# Patient Record
Sex: MALE | Race: BLACK OR AFRICAN AMERICAN | Employment: FULL TIME | URBAN - METROPOLITAN AREA
[De-identification: names, ages, dates, MRNs, and addresses within clinical notes are randomized per-mention and may not be internally consistent; named-entity substitution may affect disease eponyms.]

---

## 2022-04-26 ENCOUNTER — TELEPHONE (OUTPATIENT)
Dept: PRIMARY CARE CLINIC | Age: 27
End: 2022-04-26

## 2022-07-14 ENCOUNTER — OFFICE VISIT (OUTPATIENT)
Dept: PRIMARY CARE CLINIC | Age: 27
End: 2022-07-14
Payer: MEDICAID

## 2022-07-14 VITALS
TEMPERATURE: 99 F | OXYGEN SATURATION: 99 % | HEART RATE: 82 BPM | WEIGHT: 165.8 LBS | HEIGHT: 66 IN | RESPIRATION RATE: 16 BRPM | SYSTOLIC BLOOD PRESSURE: 110 MMHG | DIASTOLIC BLOOD PRESSURE: 70 MMHG | BODY MASS INDEX: 26.65 KG/M2

## 2022-07-14 DIAGNOSIS — G89.29 CHRONIC PAIN OF RIGHT KNEE: Primary | ICD-10-CM

## 2022-07-14 DIAGNOSIS — M25.561 CHRONIC PAIN OF RIGHT KNEE: Primary | ICD-10-CM

## 2022-07-14 PROCEDURE — 99203 OFFICE O/P NEW LOW 30 MIN: CPT | Performed by: FAMILY MEDICINE

## 2022-07-14 NOTE — PROGRESS NOTES
HPI     Chief Complaint:   Chief Complaint   Patient presents with   [facility]     routine labs. Right knee hard and swollen        Francisco Thurman is an 27 y.o. male who presents to [facility]. No currently PCP. Medical history significant for:   Patient Active Problem List   Diagnosis Code    Allergic rhinitis J30.9    Wrist fracture, left S62.102A    Scoliosis M41.9    Vaccination not carried out because of parent refusal Z28.82       Concerns for today:     Patient requests physical.    Right knee pain: Intermittently occurring since childhood, worsening around adolescence. He played soccer for years and that exacerbated pain. He's always noticed a little swelling but moreso over the last several days, along with a firm area near the anterior knee. He trains \"heavy\" with weight lifting but denies any injuries. His knees buckle randomly with ambulation. He does laborious work, deck washing. On feet all day. No rashes. No other joints with swelling. Patient has tried cutting back on lower extremity training but pain persists. Current medications include:   Current Outpatient Medications   Medication Sig    multivitamin (ONE A DAY) tablet Take 1 Tab by mouth daily. (Patient not taking: Reported on 7/14/2022)    cetirizine (ZYRTEC) 10 mg tablet Take  by mouth. (Patient not taking: Reported on 7/14/2022)     No current facility-administered medications for this visit. Allergies - reviewed:    Allergies   Allergen Reactions    Zithromax [Azithromycin] Nausea Only         Past Medical History - reviewed:  Past Medical History:   Diagnosis Date    Palpitations 2009    Peds Cardio, normal eval       Social History - reviewed:  Social History     Tobacco Use    Smoking status: Never Smoker    Smokeless tobacco: Never Used   Vaping Use    Vaping Use: Never used   Substance Use Topics    Alcohol use: No     Alcohol/week: 0.0 standard drinks    Drug use: No       Past Surgical History - reviewed:  History reviewed. No pertinent surgical history. Family History - reviewed:  Family History   Problem Relation Age of Onset    Hypertension Paternal Grandmother     Hypertension Paternal Grandfather     No Known Problems Father          Immunizations - reviewed:   Immunization History   Administered Date(s) Administered    DTaP 1997, 02/18/1998, 06/03/1998, 11/29/2001    Hep B Vaccine 1997, 06/03/1998, 09/02/1998    Hib 1997, 02/18/1998, 06/03/1998, 12/04/1998    MMR 12/04/1998, 11/29/2001    Poliovirus vaccine 1997, 02/18/1998, 11/29/2001    Tdap 08/25/2008       Review of Systems   Review of Systems   Constitutional: Negative for chills and fever. Musculoskeletal: Positive for joint pain. Negative for falls and myalgias. Skin: Negative for itching and rash. Neurological: Negative for tingling and focal weakness. Objective     Visit Vitals  /70 (BP 1 Location: Left upper arm, BP Patient Position: Sitting, BP Cuff Size: Adult)   Pulse 82   Temp 99 °F (37.2 °C) (Oral)   Resp 16   Ht 5' 6\" (1.676 m)   Wt 165 lb 12.8 oz (75.2 kg)   SpO2 99%   BMI 26.76 kg/m²       Physical Exam  Vitals and nursing note reviewed. Constitutional:       General: He is not in acute distress. Cardiovascular:      Rate and Rhythm: Normal rate and regular rhythm. Pulmonary:      Effort: Pulmonary effort is normal. No respiratory distress. Breath sounds: Normal breath sounds. Musculoskeletal:      Comments: FROM both hips, patellas, malleoli. No laxity at either patella. Right knee: no laxity with valgus or varus stress. Anterior drawer test negative. No tenderness to palpation of medial or lateral joint line. No ballotable effusion. Neurological:      Mental Status: He is alert. Assessment and Plan     Benign exam. Xray no acute pathology. Patient requests MRI.  Discussed supportive care, NSAIDS, ice therapy, and avoiding overuse. Patient wishes to proceed with MRI. Diagnoses and all orders for this visit:    1. Chronic pain of right knee  -     XR KNEE RT MIN 4 V; Future  -     MRI KNEE RT WO CONT; Future         Follow-up and Dispositions    · Return in about 3 months (around 10/14/2022) for annual physical.         I discussed the aforementioned diagnoses with the patient as well as the plan of care. All questions were answered and an AVS was provided.      Lisa Hamilton MD  [facility]  [place] [address]

## 2022-07-14 NOTE — PROGRESS NOTES
Chief Complaint   Patient presents with   [facility] Establish Care     routine labs. Right knee hard and swollen     Visit Vitals  /70 (BP 1 Location: Left upper arm, BP Patient Position: Sitting, BP Cuff Size: Adult)   Pulse 82   Temp 99 °F (37.2 °C) (Oral)   Resp 16   Ht 5' 6\" (1.676 m)   Wt 165 lb 12.8 oz (75.2 kg)   SpO2 99%   BMI 26.76 kg/m²     1. Have you been to the ER, urgent care clinic since your last visit? Hospitalized since your last visit? [facility] ER 5/2022 for broken toe    2. Have you seen or consulted any other health care providers outside of the [address] [place] [address] since your last visit? Include any pap smears or colon screening.  No

## 2022-08-08 ENCOUNTER — HOSPITAL ENCOUNTER (OUTPATIENT)
Dept: MRI IMAGING | Age: 27
Discharge: HOME | End: 2022-08-08
Attending: FAMILY MEDICINE
Payer: MEDICAID

## 2022-08-08 DIAGNOSIS — M25.561 CHRONIC PAIN OF RIGHT KNEE: ICD-10-CM

## 2022-08-08 DIAGNOSIS — G89.29 CHRONIC PAIN OF RIGHT KNEE: ICD-10-CM

## 2022-08-08 PROCEDURE — 73721 MRI JNT OF LWR EXTRE W/O DYE: CPT

## 2022-08-10 NOTE — PROGRESS NOTES
Called patient. MRI non acute. No sign of meniscal tear. Lateral pattellar tracking noted. Discussed PT, patient declines. Discussed ortho referral-patient will consider.     Dr. Miguelina Graf

## 2022-10-28 ENCOUNTER — TELEPHONE (OUTPATIENT)
Dept: PRIMARY CARE CLINIC | Age: 27
End: 2022-10-28

## 2022-10-28 NOTE — TELEPHONE ENCOUNTER
----- Message from Alvaro Patel sent at 10/27/2022  3:43 PM EDT -----  Subject: Referral Request    Reason for referral request? pt states at his last visit Dr. Radha Austin was   agreeing for patient to get physical therapy order for right knee Please   advise when order is in so patient can begin therapy  Provider patient wants to be referred to(if known):     Provider Phone Number(if known):     Additional Information for Provider?   ---------------------------------------------------------------------------  --------------  [address] [employer]    [phone]; OK to leave message on voicemail  ---------------------------------------------------------------------------  --------------

## 2022-11-08 ENCOUNTER — TELEPHONE (OUTPATIENT)
Dept: PRIMARY CARE CLINIC | Age: 27
End: 2022-11-08

## 2022-11-08 NOTE — TELEPHONE ENCOUNTER
----- Message from [employer] sent at 11/7/2022  1:35 PM EST -----  Subject: Referral Request    Reason for referral request? pt needs a referral for physical therapy for   [place] knee pain ,pt spoke to dr Olga Azevedo about this and has not heard anything   Provider patient wants to be referred to(if known):     Provider Phone Number(if known):     Additional Information for Provider?   ---------------------------------------------------------------------------  --------------  [address] [employer]    [phone]; OK to leave message on voicemail  ---------------------------------------------------------------------------  --------------

## 2024-03-26 ENCOUNTER — HOSPITAL ENCOUNTER (OUTPATIENT)
Facility: HOSPITAL | Age: 29
Discharge: HOME | End: 2024-03-29
Payer: MEDICAID

## 2024-03-26 ENCOUNTER — TRANSCRIBE ORDERS (OUTPATIENT)
Facility: HOSPITAL | Age: 29
End: 2024-03-26

## 2024-03-26 DIAGNOSIS — S86.811A RUPTURE, TENDON, PATELLAR, RIGHT, INITIAL ENCOUNTER: ICD-10-CM

## 2024-03-26 DIAGNOSIS — S89.91XA INJURY OF KNEE, RIGHT, INITIAL ENCOUNTER: ICD-10-CM

## 2024-03-26 DIAGNOSIS — M25.061 HEMARTHROSIS OF RIGHT KNEE: Primary | ICD-10-CM

## 2024-03-26 DIAGNOSIS — M25.061 HEMARTHROSIS OF RIGHT KNEE: ICD-10-CM

## 2024-03-26 PROCEDURE — 73721 MRI JNT OF LWR EXTRE W/O DYE: CPT
